# Patient Record
Sex: MALE | Race: BLACK OR AFRICAN AMERICAN | URBAN - METROPOLITAN AREA
[De-identification: names, ages, dates, MRNs, and addresses within clinical notes are randomized per-mention and may not be internally consistent; named-entity substitution may affect disease eponyms.]

---

## 2021-04-08 ENCOUNTER — NEW PATIENT (OUTPATIENT)
Dept: URBAN - METROPOLITAN AREA CLINIC 37 | Facility: CLINIC | Age: 21
End: 2021-04-08

## 2021-04-08 DIAGNOSIS — H52.223 REGULAR ASTIGMATISM, BILATERAL: Primary | ICD-10-CM

## 2021-04-08 ASSESSMENT — VISUAL ACUITY
OD: 20/20
OS: 20/20

## 2021-04-08 ASSESSMENT — INTRAOCULAR PRESSURE
OD: 16
OS: 16

## 2021-04-08 ASSESSMENT — KERATOMETRY
OD: 43.05
OS: 43.60

## 2021-04-16 ENCOUNTER — Encounter (OUTPATIENT)
Dept: URBAN - METROPOLITAN AREA CLINIC 37 | Facility: CLINIC | Age: 21
End: 2021-04-16

## 2021-04-16 PROCEDURE — LASIK LASIK SURGEON'S FEE: CUSTOM | Performed by: OPHTHALMOLOGY

## 2021-04-16 PROCEDURE — LASIK LASIK PRKSURGEON'S FEE: CUSTOM | Performed by: OPHTHALMOLOGY

## 2021-04-17 ENCOUNTER — POST OP (OUTPATIENT)
Dept: URBAN - METROPOLITAN AREA CLINIC 10 | Facility: CLINIC | Age: 21
End: 2021-04-17

## 2021-04-17 PROCEDURE — 99024 POSTOP FOLLOW-UP VISIT: CPT | Performed by: OPTOMETRIST

## 2021-05-18 ENCOUNTER — POST-OPERATIVE VISIT (OUTPATIENT)
Dept: URBAN - METROPOLITAN AREA CLINIC 56 | Facility: CLINIC | Age: 21
End: 2021-05-18

## 2021-05-18 PROCEDURE — 99024 POSTOP FOLLOW-UP VISIT: CPT | Performed by: OPTOMETRIST

## 2021-05-18 ASSESSMENT — VISUAL ACUITY
OS: 20/20
OD: 20/20

## 2021-05-18 ASSESSMENT — INTRAOCULAR PRESSURE
OS: 15
OD: 15

## 2021-05-18 NOTE — IMPRESSION/PLAN
Impression: S/P  [place] w/ Intralase OU - 32 Days. Encounter for surgical aftercare following surgery on a sense organ  Z48.810. Plan: Excellent post operative course; however, because of localized inferior punctuate staining in the Left eye, recommend the  patient be more aggressive in instilling ocular lubrication into the his left eye. Recommend instilling ocular lubricant four times a day into the left eye and return in 2 weeks for a final post op visit. Gave samples of both preserved and non preserved ocular lubricant. RTC 2 weeks PO visit.

## 2021-06-01 ENCOUNTER — POST-OPERATIVE VISIT (OUTPATIENT)
Dept: URBAN - METROPOLITAN AREA CLINIC 56 | Facility: CLINIC | Age: 21
End: 2021-06-01

## 2021-06-01 DIAGNOSIS — Z48.810 ENCOUNTER FOR SURGICAL AFTERCARE FOLLOWING SURGERY ON A SENSE ORGAN: Primary | ICD-10-CM

## 2021-06-01 PROCEDURE — 99024 POSTOP FOLLOW-UP VISIT: CPT | Performed by: OPTOMETRIST

## 2021-06-01 ASSESSMENT — INTRAOCULAR PRESSURE
OS: 16
OD: 16

## 2021-06-01 NOTE — IMPRESSION/PLAN
Impression: S/P  [place] w/ Intralase OU - 46 Days. Encounter for surgical aftercare following surgery on a sense organ  Z48.810. Plan: The patient is satisfied with his vision ; however, because of localized inferior pinpoint granular subepithelial haze, recommend he follow-up in the refractive clinic. The patient his eyes do feel more comfortable since instilling artificial tears more frequently.  
RTC Dr Katiana Sánchez for a PO FU